# Patient Record
Sex: FEMALE | Race: AMERICAN INDIAN OR ALASKA NATIVE
[De-identification: names, ages, dates, MRNs, and addresses within clinical notes are randomized per-mention and may not be internally consistent; named-entity substitution may affect disease eponyms.]

---

## 2018-09-18 ENCOUNTER — HOSPITAL ENCOUNTER (INPATIENT)
Dept: HOSPITAL 5 - ED | Age: 23
LOS: 2 days | Discharge: HOME | DRG: 779 | End: 2018-09-20
Attending: OBSTETRICS & GYNECOLOGY | Admitting: OBSTETRICS & GYNECOLOGY
Payer: COMMERCIAL

## 2018-09-18 DIAGNOSIS — O02.1: Primary | ICD-10-CM

## 2018-09-18 LAB
BASOPHILS # (AUTO): 0 K/MM3 (ref 0–0.1)
BASOPHILS NFR BLD AUTO: 0.3 % (ref 0–1.8)
BILIRUB UR QL STRIP: (no result)
BLOOD UR QL VISUAL: (no result)
EOSINOPHIL # BLD AUTO: 0.2 K/MM3 (ref 0–0.4)
EOSINOPHIL NFR BLD AUTO: 2 % (ref 0–4.3)
HCT VFR BLD CALC: 31.5 % (ref 30.3–42.9)
HGB BLD-MCNC: 11 GM/DL (ref 10.1–14.3)
LYMPHOCYTES # BLD AUTO: 1.6 K/MM3 (ref 1.2–5.4)
LYMPHOCYTES NFR BLD AUTO: 17.5 % (ref 13.4–35)
MCH RBC QN AUTO: 29 PG (ref 28–32)
MCHC RBC AUTO-ENTMCNC: 35 % (ref 30–34)
MCV RBC AUTO: 83 FL (ref 79–97)
MONOCYTES # (AUTO): 0.7 K/MM3 (ref 0–0.8)
MONOCYTES % (AUTO): 7.4 % (ref 0–7.3)
MUCOUS THREADS #/AREA URNS HPF: (no result) /HPF
PH UR STRIP: 6 [PH] (ref 5–7)
PLATELET # BLD: 254 K/MM3 (ref 140–440)
RBC # BLD AUTO: 3.8 M/MM3 (ref 3.65–5.03)
RBC #/AREA URNS HPF: > 182 /HPF (ref 0–6)
UROBILINOGEN UR-MCNC: < 2 MG/DL (ref ?–2)
WBC #/AREA URNS HPF: 54 /HPF (ref 0–6)

## 2018-09-18 PROCEDURE — 86592 SYPHILIS TEST NON-TREP QUAL: CPT

## 2018-09-18 PROCEDURE — 88305 TISSUE EXAM BY PATHOLOGIST: CPT

## 2018-09-18 PROCEDURE — 81001 URINALYSIS AUTO W/SCOPE: CPT

## 2018-09-18 PROCEDURE — 86850 RBC ANTIBODY SCREEN: CPT

## 2018-09-18 PROCEDURE — 90707 MMR VACCINE SC: CPT

## 2018-09-18 PROCEDURE — 36415 COLL VENOUS BLD VENIPUNCTURE: CPT

## 2018-09-18 PROCEDURE — 84702 CHORIONIC GONADOTROPIN TEST: CPT

## 2018-09-18 PROCEDURE — 85018 HEMOGLOBIN: CPT

## 2018-09-18 PROCEDURE — 86901 BLOOD TYPING SEROLOGIC RH(D): CPT

## 2018-09-18 PROCEDURE — 86900 BLOOD TYPING SEROLOGIC ABO: CPT

## 2018-09-18 PROCEDURE — 76805 OB US >/= 14 WKS SNGL FETUS: CPT

## 2018-09-18 PROCEDURE — 85014 HEMATOCRIT: CPT

## 2018-09-18 PROCEDURE — 85025 COMPLETE CBC W/AUTO DIFF WBC: CPT

## 2018-09-18 NOTE — ULTRASOUND REPORT
FINAL REPORT



PROCEDURE:  Limited obstetrical ultrasound. 



TECHNIQUE:  Real-time limited sonographic examination was

performed for evaluation of fetal size, position, heartbeat,

fluid volume  for each fetus with image documentation (1 or more

fetuses). CPT 25267



HISTORY:  Pregnancy, vaginal bleeding. 



COMPARISON:  No prior studies are available for comparison.



FINDINGS:  

There is a single viable fetus in breech presentation. Cardiac

activity is documented at 153 beats per minute. There are no

definite congenital anomalies. There may be small bilateral

choroid plexus cysts. A detailed fetal anatomic survey was not

performed.. The cervix appears to be wide open. There is amniotic

fluid protruding into the vagina which is distended. An impending

spontaneous  is likely. The amniotic fluid volume appears

normal. The placenta is posterior in location. The measured fetal

parameters are as follows: Biparietal diameter 4.4 centimeters,

head circumference 16.9 centimeters, abdominal circumference 14.6

centimeters, femur length 3.2 centimeters. The calculated

menstrual age is 19 weeks 5 days. The estimated date of

confinement is 2019. The estimated fetal weight is 320

grams. 



IMPRESSION:  

Probable impending spontaneous  in breech presentation.

Currently viable fetus with heart rate of 153 beats per minute.

## 2018-09-19 LAB
HCT VFR BLD CALC: 27.2 % (ref 30.3–42.9)
HGB BLD-MCNC: 9.1 GM/DL (ref 10.1–14.3)

## 2018-09-19 RX ADMIN — BUTORPHANOL TARTRATE PRN MG: 2 INJECTION, SOLUTION INTRAMUSCULAR; INTRAVENOUS at 03:08

## 2018-09-19 RX ADMIN — IBUPROFEN SCH: 600 TABLET, FILM COATED ORAL at 12:32

## 2018-09-19 RX ADMIN — Medication SCH EACH: at 10:50

## 2018-09-19 RX ADMIN — FERROUS SULFATE TAB 325 MG (65 MG ELEMENTAL FE) SCH MG: 325 (65 FE) TAB at 23:23

## 2018-09-19 RX ADMIN — FERROUS SULFATE TAB 325 MG (65 MG ELEMENTAL FE) SCH MG: 325 (65 FE) TAB at 10:50

## 2018-09-19 RX ADMIN — BUTORPHANOL TARTRATE PRN MG: 2 INJECTION, SOLUTION INTRAMUSCULAR; INTRAVENOUS at 00:36

## 2018-09-19 NOTE — PROCEDURE NOTE
OB Delivery Note





- Delivery


Date of Delivery: 18


Surgeon: MARYAN ROLDAN


Estimated blood loss: other (150cc)





- Vaginal


Delivery presentation: breech


Intrapartum events: no prenatal care,  labor-<37 weeks


Delivery induction: none


Delivery augmentation: rupture of membranes


Delivery monitor: external uterine


Route of delivery: 


Delivery placenta: spontaneous


Delivery cord: 3 umbilical vessels


Episiotomy: none


Delivery laceration: none


Anesthesia: intravenous





- Infant


  ** A


Apgar at 1 minute: 0


Apgar at 5 minutes: 0


Infant Gender: Male (259gms)

## 2018-09-19 NOTE — HISTORY AND PHYSICAL REPORT
History of Present Illness


Date of examination: 18


Date of admission: 


18 23:47





Chief complaint: 





Labor


History of present illness: 





Pt is a 24yo BF  EGA 19 5/7 weeks presents to L&D from [facility] ER with BBOW @ 

vaginal introitus. Pt did not receive prenatal care with this pregnancy. Ob u/s 

showed IUP @ 19 5/7 weeks, breech presentation.





Past History


Past Medical History: no pertinent history


Past Surgical History: no surgical history


Family/Genetic History: none


Social history: no significant social history, single





- Obstetrical History


: 1





Medications and Allergies


 Allergies











Allergy/AdvReac Type Severity Reaction Status Date / Time


 


No Known Allergies Allergy   Verified 18 23:51











 Home Medications











 Medication  Instructions  Recorded  Confirmed  Last Taken  Type


 


Acetaminophen [Tylenol] 325 mg PO PRN 18 History


 


Ferrous Sulfate [Feosol 325 MG tab] 325 mg PO BID #60 tablet 18  Unknown 

Rx


 


Ibuprofen [Motrin 600 MG tab] 600 mg PO Q6HR #30 tablet 18  Unknown Rx


 


Prenatal Vit-Fe Fumar-FA [Prenatal 1 each PO QDAY #30 tablet 18  Unknown 

Rx





Vitamin]     











Active Meds: 


Active Medications





Butorphanol Tartrate (Stadol)  2 mg IV Q2H PRN


   PRN Reason: Labor Pain


   Last Admin: 18 03:08 Dose:  2 mg


Lactated Ringer's (Lactated Ringers)  1,000 mls @ 125 mls/hr IV AS DIRECT ANGELES


   Last Admin: 18 00:10 Dose:  125 mls/hr


Magnesium Sulfate (Magnesium Sulfate 40gm/1000ml)  40 gm in 1,000 mls @ 50 mls/

hr IV AS DIRECT ANGELES


   Last Admin: 18 02:06 Dose:  2 gm/hr, 50 mls/hr


Zolpidem Tartrate (Ambien)  10 mg PO QHS PRN


   PRN Reason: Insomnia


   Last Admin: 18 01:12 Dose:  10 mg











Review of Systems


All systems: negative





- Vital Signs


Vital signs: 


 Vital Signs











Temp Pulse Resp BP Pulse Ox


 


 99.2 F   97 H  16   108/65   100 


 


 18 20:38  18 20:38  18 20:38  18 20:38  18 20:38








 











Temp Pulse Resp BP Pulse Ox


 


 98.1 F   91 H  18   107/55   100 


 


 18 00:08  18 03:08  18 03:38  18 03:07  18 03:08














- Physical Exam


Breasts: Positive: deferred


Cardiovascular: Regular rate


Lungs: Positive: Clear to auscultation


Abdomen: Positive: normal appearance


Genitourinary (Female): Positive: normal external genitalia


Vagina: Positive: discharge


Uterus: Positive: enlarged


Extremities: Positive: normal





- Obstetrical


Cervical Dilatation: 10


Cervical Effacement Percentage: 100


Fetal station: +2


Uterine Contraction Pattern: Regular


Uterine Tone Measurement Phase: Contraction





Results


Result Diagrams: 


 18 16:42





 Abnormal lab results











  18 Range/Units





  21:04 21:04 21:46 


 


MCHC  35 H    (30-34)  %


 


RDW  15.3 H    (13.2-15.2)  %


 


Mono % (Auto)  7.4 H    (0.0-7.3)  %


 


Seg Neutrophils %  72.8 H    (40.0-70.0)  %


 


HCG, Quant   9287 H   (0-4)  mIU/mL


 


Urine WBC (Auto)    54.0 H  (0.0-6.0)  /HPF








All other labs normal.





Ultrasound: report reviewed





Assessment and Plan





- Patient Problems


(1) 19 weeks gestation of pregnancy


Onset Date: 18   Current Visit: Yes   Status: Resolved   


Plan to address problem: 


A:  IUP @ 19 5/7 weeks in labor


      Suspect cervical incompetence


      Threatened 


      No prenatal care





 P:  Admit to L&D for expectant vaginal delivery








(2) Threatened 


Onset Date: 18   Current Visit: Yes   Status: Resolved

## 2018-09-20 VITALS — DIASTOLIC BLOOD PRESSURE: 67 MMHG | SYSTOLIC BLOOD PRESSURE: 101 MMHG

## 2018-09-20 RX ADMIN — Medication SCH EACH: at 09:40

## 2018-09-20 RX ADMIN — FERROUS SULFATE TAB 325 MG (65 MG ELEMENTAL FE) SCH MG: 325 (65 FE) TAB at 09:39

## 2018-09-20 RX ADMIN — IBUPROFEN SCH: 600 TABLET, FILM COATED ORAL at 06:05

## 2018-09-20 RX ADMIN — IBUPROFEN SCH: 600 TABLET, FILM COATED ORAL at 00:05

## 2018-09-20 NOTE — DISCHARGE SUMMARY
Providers





- Providers


Date of Admission: 


18 23:47





Date of discharge: 18


Attending physician: 


MARYAN ROLDAN





Primary care physician: 


JAMES DOC








Hospitalization


Reason for admission: active labor,  labor


Delivery: 


Episiotomy: none


Laceration: none


Other postpartum procedures: none


Postpartum complications: none


Discharge diagnosis: intrapartum fetal demise


[place] baby: male


Hospital course: 





Unremarkable.


Condition at discharge: Good


Disposition: DC-01 TO HOME OR SELFCARE





- Discharge Diagnoses


(1) 19 weeks gestation of pregnancy


Status: Resolved   





(2) Threatened 


Status: Resolved   





(3) Complete 


Status: Resolved   





Plan





- Discharge Medications


Prescriptions: 


Ferrous Sulfate [Feosol 325 MG tab] 325 mg PO BID #60 tablet


Ibuprofen [Motrin 600 MG tab] 600 mg PO Q6HR #30 tablet


Prenatal Vit-Fe Fumar-FA [Prenatal Vitamin] 1 each PO QDAY #30 tablet





- Provider Discharge Summary


Activity: routine, no sex for 6 weeks, no heavy lifting 4 weeks, no strenuous 

exercise


Diet: routine


Instructions: routine


Additional instructions: 


[]  Smoking cessation referral if applicable(refer to patient education folder 

for contact #)


[]  Refer to [facility] Booklet








Call your doctor immediately for:


* Fever > 100.5


* Heavy vaginal bleeding ( >1 pad per hour)


* Severe persistent headache


* Shortness of breath


* Reddened, hot, painful area to leg or breast


* Drainage or odor from incision.





* Keep incision clean and dry at all times and follow doctor's instructions 

regarding bathing/showering











- Follow up plan


Follow up: 


JAMES SHELBY MD [Primary Care Provider] - 3-5 Days


MARYAN ROLDAN MD [Staff Physician] - 6 Weeks

## 2018-09-20 NOTE — PROGRESS NOTE
Assessment and Plan





- Patient Problems


(1) 19 weeks gestation of pregnancy


Onset Date: 18   Current Visit: Yes   Status: Resolved   





(2) Threatened 


Onset Date: 18   Current Visit: Yes   Status: Resolved   





(3) Complete 


Onset Date: 18   Current Visit: Yes   Status: Resolved   


Plan to address problem: 


A:  S/P Complete miscarriage - stable





 P:  May go home today.








Subjective





- Subjective


Date of service: 18


Principal diagnosis: s/p SAB - PPD #1


Interval history: 





Pt is feeling well without complaints.  Bleeding improved.


Patient reports: appetite normal, voiding normally, pain well controlled, flatus

, ambulating normally, no dizzy ambulation, no nauseated


: 





Objective





- Vital Signs


Latest vital signs: 


 Vital Signs











  Temp Pulse Resp BP Pulse Ox


 


 18 04:10  98.7 F  61  18  101/72 


 


 18 00:00  98.4 F  71  18  101/64 


 


 18 19:30  98.6 F  74  16  111/57 


 


 18 16:49  98.7 F  80  16  104/57  100


 


 18 12:00  98.6 F  73  16  97/54  99


 


 18 08:50  98.4 F  94 H  16  112/54  100








 Intake and Output











 18





 22:59 06:59 14:59


 


Intake Total 420 300 


 


Output Total 400  


 


Balance 20 300 


 


Intake:   


 


  Intake, Free Water 420 300 


 


Output:   


 


  Urine 400  


 


    Void 400  


 


Other:   


 


  Total, Output Amount 400  














- Exam


Breasts: Present: deferred


Cardiovascular: Present: Regular rate


Lungs: Present: Clear to auscultation


Abdomen: Present: normal appearance


Uterus: Present: normal, firm, fundal height below umbilicus


Extremities: Present: normal





- Labs


Labs: 


 Abnormal lab results











  18 Range/Units





  16:42 


 


Hgb  9.1 L  (10.1-14.3)  gm/dl


 


Hct  27.2 L  (30.3-42.9)  %








 Laboratory Tests











  18





  21:04 21:04 21:04


 


WBC  9.4  


 


RBC  3.80  


 


Hgb  11.0  


 


Hct  31.5  


 


MCV  83  


 


MCH  29  


 


MCHC  35 H  


 


RDW  15.3 H  


 


Plt Count  254  


 


Lymph % (Auto)  17.5  


 


Mono % (Auto)  7.4 H  


 


Eos % (Auto)  2.0  


 


Baso % (Auto)  0.3  


 


Lymph #  1.6  


 


Mono #  0.7  


 


Eos #  0.2  


 


Baso #  0.0  


 


Seg Neutrophils %  72.8 H  


 


Seg Neutrophils #  6.8  


 


HCG, Quant   9287 H 


 


Urine Color   


 


Urine Turbidity   


 


Urine pH   


 


Ur Specific Gravity   


 


Urine Protein   


 


Urine Glucose (UA)   


 


Urine Ketones   


 


Urine Blood   


 


Urine Nitrite   


 


Urine Bilirubin   


 


Urine Urobilinogen   


 


Ur Leukocyte Esterase   


 


Urine WBC (Auto)   


 


Urine RBC (Auto)   


 


U Epithel Cells (Auto)   


 


Urine Mucus   


 


RPR   


 


Blood Type    O POSITIVE


 


Antibody Screen    Negative














  18





  21:46 04:41 16:42


 


WBC   


 


RBC   


 


Hgb    9.1 L


 


Hct    27.2 L


 


MCV   


 


MCH   


 


MCHC   


 


RDW   


 


Plt Count   


 


Lymph % (Auto)   


 


Mono % (Auto)   


 


Eos % (Auto)   


 


Baso % (Auto)   


 


Lymph #   


 


Mono #   


 


Eos #   


 


Baso #   


 


Seg Neutrophils %   


 


Seg Neutrophils #   


 


HCG, Quant   


 


Urine Color  Red  


 


Urine Turbidity  Cloudy  


 


Urine pH  6.0  


 


Ur Specific Gravity  1.014  


 


Urine Protein  100 mg/dl  


 


Urine Glucose (UA)  50  


 


Urine Ketones  Neg  


 


Urine Blood  Lg  


 


Urine Nitrite  Neg  


 


Urine Bilirubin  Neg  


 


Urine Urobilinogen  < 2.0  


 


Ur Leukocyte Esterase  Sm  


 


Urine WBC (Auto)  54.0 H  


 


Urine RBC (Auto)  > 182.0  


 


U Epithel Cells (Auto)  2.0  


 


Urine Mucus  Few  


 


RPR   Nonreactive 


 


Blood Type   


 


Antibody Screen